# Patient Record
(demographics unavailable — no encounter records)

---

## 2024-12-23 NOTE — HISTORY OF PRESENT ILLNESS
[Sudden] : sudden [10] : 10 [0] : 0 [Dull/Aching] : dull/aching [Sharp] : sharp [Tightness] : tightness [Squeezing] : squeezing [Frequent] : frequent [Leisure] : leisure [Sleep] : sleep [Rest] : rest [Meds] : meds [Lying in bed] : lying in bed [de-identified] : 59 year RHD old male with pain in the right shoulder region, symptoms started 9 days ago, feels he may have slept awkwardly. Symptoms do radiate into the RUE, no prior history of injury prior to this. He saw his PCP and was placed on IBU, Neurontin and Flexeril with mild help from PMD  [] : no [FreeTextEntry1] : Right Shoulder [FreeTextEntry3] : 12/14/24 [FreeTextEntry5] : Patient is 59 years old and is here to be evaluated and treated for the RIGHT SHOULDER. Patient states they have been experiencing spasms in the right shoulder and was seen by their PCP that referred them to see an orthopedic. [FreeTextEntry9] : ibuprofen/ prescribed meds [de-identified] : Reraching [de-identified] : PCP

## 2024-12-23 NOTE — PHYSICAL EXAM
[Extension] : extension [Straightening consistent with spasm] : Straightening consistent with spasm [Disc space narrowing] : Disc space narrowing [Right] : right shoulder [There are no fractures, subluxations or dislocations. No significant abnormalities are seen] : There are no fractures, subluxations or dislocations. No significant abnormalities are seen [] : positive impingement testing

## 2024-12-23 NOTE — DISCUSSION/SUMMARY
[de-identified] : modify activities use elastic sleeve for structural support try OTC meds ice as needed try topical lidocaine for pain control reviewed current medications used by this patient home exercises for functional return try lido patch 12/23/2024    RE:  SAM STORMCE   Acct #- [id number]    Attention:  Nurse Reviewer /Medical Director  I am writing this letter as a medical necessity for PT program. Patient has tried analgesics, non-steroid anti-inflammatory agents,  hot or cold compresses,injections of corticosteroids, etc)  which in combination or by themselves has not worked. Based on my patient's condition, I strongly believe that the PT is medically needed.   Thank you for your time and consideration.